# Patient Record
Sex: FEMALE | Race: WHITE | HISPANIC OR LATINO | Employment: STUDENT | URBAN - METROPOLITAN AREA
[De-identification: names, ages, dates, MRNs, and addresses within clinical notes are randomized per-mention and may not be internally consistent; named-entity substitution may affect disease eponyms.]

---
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
## 2025-05-01 ENCOUNTER — APPOINTMENT (OUTPATIENT)
Dept: PRIMARY CARE | Facility: CLINIC | Age: 20
End: 2025-05-01
Payer: COMMERCIAL

## 2025-05-01 ENCOUNTER — OFFICE VISIT (OUTPATIENT)
Dept: PRIMARY CARE | Facility: CLINIC | Age: 20
End: 2025-05-01
Payer: COMMERCIAL

## 2025-05-01 VITALS
DIASTOLIC BLOOD PRESSURE: 62 MMHG | BODY MASS INDEX: 28 KG/M2 | HEIGHT: 63 IN | SYSTOLIC BLOOD PRESSURE: 108 MMHG | HEART RATE: 93 BPM | OXYGEN SATURATION: 97 % | WEIGHT: 158 LBS

## 2025-05-01 DIAGNOSIS — H10.10 ALLERGIC RHINOCONJUNCTIVITIS: ICD-10-CM

## 2025-05-01 DIAGNOSIS — J30.9 ALLERGIC RHINOCONJUNCTIVITIS: ICD-10-CM

## 2025-05-01 DIAGNOSIS — M54.50 CHRONIC BILATERAL LOW BACK PAIN, UNSPECIFIED WHETHER SCIATICA PRESENT: ICD-10-CM

## 2025-05-01 DIAGNOSIS — G89.29 CHRONIC BILATERAL LOW BACK PAIN, UNSPECIFIED WHETHER SCIATICA PRESENT: ICD-10-CM

## 2025-05-01 DIAGNOSIS — Z00.00 WELL ADULT EXAM: Primary | ICD-10-CM

## 2025-05-01 PROCEDURE — 99395 PREV VISIT EST AGE 18-39: CPT | Performed by: FAMILY MEDICINE

## 2025-05-01 PROCEDURE — 1036F TOBACCO NON-USER: CPT | Performed by: FAMILY MEDICINE

## 2025-05-01 PROCEDURE — 3008F BODY MASS INDEX DOCD: CPT | Performed by: FAMILY MEDICINE

## 2025-05-01 RX ORDER — FLUTICASONE PROPIONATE 50 MCG
2 SPRAY, SUSPENSION (ML) NASAL DAILY
Qty: 16 G | Refills: 5 | Status: SHIPPED | OUTPATIENT
Start: 2025-05-01 | End: 2026-05-01

## 2025-05-01 ASSESSMENT — PATIENT HEALTH QUESTIONNAIRE - PHQ9
SUM OF ALL RESPONSES TO PHQ9 QUESTIONS 1 AND 2: 0
1. LITTLE INTEREST OR PLEASURE IN DOING THINGS: NOT AT ALL
2. FEELING DOWN, DEPRESSED OR HOPELESS: NOT AT ALL

## 2025-05-01 NOTE — PROGRESS NOTES
"  Subjective   Patient ID: Angelica Menon is a 20 y.o. female who presents for Annual Exam (Having back pain, not new, has just been increasing. ).  NEW patient    Past Medical, Surgical, Social and Family Hx reviewed-Yes    Any acute complaints?    none    Any chronic issues addressed today or Rx refills needed?    No Rxs.  She has had low back pain off and on for years.  Reviewed past notes and Xrays from 2019    Last pap never  Last Mammogram N/A  Colon CA screening Hx N/A  Labs 2023 all normal  Up to date on immunizations yes, except flu and covid  Dentist in the past year yes    Menstruation no concerns, regular  Sexual Activity not yet        PE:  /62   Pulse 93   Ht 1.6 m (5' 3\")   Wt 71.7 kg (158 lb)   LMP 04/17/2025 (Approximate)   SpO2 97%   BMI 27.99 kg/m²   Alert adult woman, NAD  HEENT clear  Neck supple, No LAD  Heart RRR no murmur  Lungs CTA bilat  Abdomen benign, normal BS, soft NT/ND  Skin warm, dry, clear  Neuro grossly normal, gait WNL  Affect pleasant and appropriate, memory and judgement WNL  Back seems to have limited ROM, some pain with extension        Assessment/Plan   Assessment & Plan  Well adult exam  Reviewed HM and vaccines        Allergic rhinoconjunctivitis    Orders:    fluticasone (Flonase) 50 mcg/actuation nasal spray; Administer 2 sprays into each nostril once daily. Shake gently. Before first use, prime pump. After use, clean tip and replace cap.    Chronic bilateral low back pain, unspecified whether sciatica present  I suggest she see Chiropractor and if not helpful after 2-3 visits, consider new imaging or Ortho referral              "

## 2025-05-05 ENCOUNTER — APPOINTMENT (OUTPATIENT)
Dept: PRIMARY CARE | Facility: CLINIC | Age: 20
End: 2025-05-05
Payer: COMMERCIAL

## 2025-07-17 ENCOUNTER — APPOINTMENT (OUTPATIENT)
Dept: DERMATOLOGY | Facility: CLINIC | Age: 20
End: 2025-07-17
Payer: COMMERCIAL

## 2025-09-25 ENCOUNTER — APPOINTMENT (OUTPATIENT)
Dept: DERMATOLOGY | Facility: CLINIC | Age: 20
End: 2025-09-25
Payer: COMMERCIAL